# Patient Record
Sex: MALE | Race: AMERICAN INDIAN OR ALASKA NATIVE
[De-identification: names, ages, dates, MRNs, and addresses within clinical notes are randomized per-mention and may not be internally consistent; named-entity substitution may affect disease eponyms.]

---

## 2019-04-06 ENCOUNTER — HOSPITAL ENCOUNTER (INPATIENT)
Dept: HOSPITAL 31 - C.ER | Age: 34
LOS: 4 days | Discharge: HOME | DRG: 750 | End: 2019-04-10
Attending: PSYCHIATRY & NEUROLOGY | Admitting: PSYCHIATRY & NEUROLOGY
Payer: COMMERCIAL

## 2019-04-06 DIAGNOSIS — F41.9: ICD-10-CM

## 2019-04-06 DIAGNOSIS — F25.0: Primary | ICD-10-CM

## 2019-04-06 DIAGNOSIS — F12.259: ICD-10-CM

## 2019-04-06 LAB
ALBUMIN SERPL-MCNC: 5.2 G/DL (ref 3.5–5)
ALBUMIN/GLOB SERPL: 1.3 {RATIO} (ref 1–2.1)
ALT SERPL-CCNC: 42 U/L (ref 21–72)
ANISOCYTOSIS BLD QL SMEAR: SLIGHT
AST SERPL-CCNC: 76 U/L (ref 17–59)
BASOPHILS # BLD AUTO: 0 K/UL (ref 0–0.2)
BASOPHILS NFR BLD: 0.7 % (ref 0–2)
BILIRUB UR-MCNC: NEGATIVE MG/DL
BUN SERPL-MCNC: 16 MG/DL (ref 9–20)
CALCIUM SERPL-MCNC: 10.5 MG/DL (ref 8.6–10.4)
EOSINOPHIL # BLD AUTO: 0 K/UL (ref 0–0.7)
EOSINOPHIL NFR BLD: 0.2 % (ref 0–4)
ERYTHROCYTE [DISTWIDTH] IN BLOOD BY AUTOMATED COUNT: 13.7 % (ref 11.5–14.5)
GFR NON-AFRICAN AMERICAN: > 60
GIANT PLATELETS BLD QL SMEAR: PRESENT
GLUCOSE UR STRIP-MCNC: NORMAL MG/DL
HGB BLD-MCNC: 16.3 G/DL (ref 12–18)
LEUKOCYTE ESTERASE UR-ACNC: (no result) LEU/UL
LG PLATELETS BLD QL SMEAR: PRESENT
LYMPHOCYTE: 11 % (ref 20–40)
LYMPHOCYTES # BLD AUTO: 0.6 K/UL (ref 1–4.3)
LYMPHOCYTES NFR BLD AUTO: 9.5 % (ref 20–40)
MCH RBC QN AUTO: 29.7 PG (ref 27–31)
MCHC RBC AUTO-ENTMCNC: 33.7 G/DL (ref 33–37)
MCV RBC AUTO: 87.9 FL (ref 80–94)
MONOCYTE: 6 % (ref 0–10)
MONOCYTES # BLD: 0.3 K/UL (ref 0–0.8)
MONOCYTES NFR BLD: 4.2 % (ref 0–10)
NEUTROPHILS # BLD: 5.7 K/UL (ref 1.8–7)
NEUTROPHILS NFR BLD AUTO: 83 % (ref 50–75)
NEUTROPHILS NFR BLD AUTO: 85.4 % (ref 50–75)
NRBC BLD AUTO-RTO: 0.1 % (ref 0–2)
PH UR STRIP: 5 [PH] (ref 5–8)
PLATELET # BLD EST: NORMAL 10*3/UL
PLATELET # BLD: 268 K/UL (ref 130–400)
PMV BLD AUTO: 8.9 FL (ref 7.2–11.7)
PROT UR STRIP-MCNC: (no result) MG/DL
RBC # BLD AUTO: 5.49 MIL/UL (ref 4.4–5.9)
RBC # UR STRIP: NEGATIVE /UL
SP GR UR STRIP: 1.03 (ref 1–1.03)
SQUAMOUS EPITHIAL: 1 /HPF (ref 0–5)
TOTAL CELLS COUNTED BLD: 100
UROBILINOGEN UR-MCNC: NORMAL MG/DL (ref 0.2–1)
WBC # BLD AUTO: 6.6 K/UL (ref 4.8–10.8)

## 2019-04-06 NOTE — RAD
Date of service: 



04/06/2019



HISTORY:

 medical clearance 



COMPARISON:

No prior.



TECHNIQUE:

Chest PA and lateral views



FINDINGS:



LUNGS:

No active pulmonary disease.



PLEURA:

No significant pleural effusion identified. No pneumothorax apparent.



CARDIOVASCULAR:

No aortic atherosclerotic calcification present.



Normal cardiac size. No pulmonary vascular congestion. 



OSSEOUS STRUCTURES:

No significant abnormalities.



VISUALIZED UPPER ABDOMEN:

Normal.



OTHER FINDINGS:

None.



IMPRESSION:

No active disease.

## 2019-04-07 RX ADMIN — DIVALPROEX SODIUM SCH MG: 500 TABLET, DELAYED RELEASE ORAL at 17:41

## 2019-04-07 NOTE — PCM.PSYCH
Initial Psychiatric Evaluation





- Initial Psychiatric Evaluation


Type of Admission: Voluntary


Legal Status: Capacity


Chief Complaint (in patient's own words): 


I want to leave.


History of Present Illness and Precipitating Events: 





Patient is a 32 y/o -American male, who was escorted to the ED by his 

family because of the bizarre behavior. 





As per the mother, patient has been paranoid and bizarre since yesterday. 

Patient told family that it feels like the devil is out to get him.  As noted, 

patient had a similar episode 4 years ago, when he smoked laced marijuana.  He 

was admitted at the [facility] involuntarily.  However he did not follow-

up with any psychiatrist after discharge. As per the mother, he has been smoking

marijuana again.  They brought him to the ED as he was uncontrollable and 

violent towards the family.





Patient appeared very disorganized and internally preoccupied.  He was 

responding to internal stimuli.  As per the staff, he is becoming increasingly 

irritable and agitated and attacking the staff in the ED.  Patient was medicated

and put on four-point restraint multiple times due to aggressive and agitated 

behavior towards the staff.  He remained paranoid, delusional and bizarre.  He 

was put on one-to-one.  He appeared to have loose associations and he remained 

partially mute to most of the questions.





Past medical history


None reported


Current Medications: 





Active Medications











Generic Name Dose Route Start Last Admin





  Trade Name Freq  PRN Reason Stop Dose Admin


 


Benztropine Mesylate  2 mg  04/07/19 10:07  





  Cogentin  PO   





  Q6 PRN   





  Extra Pyramidal Symptoms   





     





     





     


 


Diphenhydramine HCl  50 mg  04/07/19 10:07  





  Benadryl  PO   





  Q6 PRN   





  Extra Pyramidal Symptoms   





     





     





     


 


Diphenhydramine HCl  50 mg  04/07/19 10:08  





  Benadryl  IM   





  Q8 PRN   





  Agitation   





     





     





     


 


Haloperidol  5 mg  04/07/19 10:07  





  Haldol  PO   





  Q8 PRN   





  Moderate Agitation   





     





     





     


 


Haloperidol Lactate  5 mg  04/07/19 10:07  04/07/19 10:31





  Haldol  IM   5 mg





  Q8 PRN   Administration





  Moderate Agitation    





     





     





     


 


Lorazepam  2 mg  04/07/19 10:07  





  Ativan  PO   





  Q8H PRN   





  Severe Agitation   





     





     





     


 


Lorazepam  1 mg  04/07/19 10:08  





  Ativan  IM   





  Q6H PRN   





  Anxiety   





     





     





     














Past Psychiatric History





- Past Psychiatric History


Previous Treatment History: Inpatient


Pertinent Medical Hx (Current Medical&Sleep Prob, Allergies): 





                                    Allergies











Allergy/AdvReac Type Severity Reaction Status Date / Time


 


No Known Allergies Allergy   Verified 04/06/19 15:12








                                        





No Known Home Med  04/06/19 











Review of Systems





- Review of Systems


All systems: reviewed and no additional remarkable complaints except





- Psychiatric


Psychiatric: Auditory Hallucinations, Behavioral Changes, Hallucinations, 

Irritability, Paranoia





Mental Status Examination





- Personal Presentation


Personal Presentation: Looks stated age





- Affect


Affect: Broad





- Motor Activity


Motor Activity: Psychomotor Agitation





- Reliability in Providing Information


Reliability in Providing Information: Poor, due to alteration in thoughts, Poor,

due to altered mood





- Speech


Speech: Disorganized





- Mood


Mood: Euphoric





- Formal Thought Process


Formal Thought Process: Hallucinations, Delusions, Paranoia, Loosening of 

associations, Flight of ideas





- Hallucinations/Delusions


Hallucinations: Visual, Auditory


Delusions: Persecution





- Obsessions/Compulsions


Obsessions: No


Compulsions: No





- Cognitive Functions


Orientation: Person, Place, Situation, Time


Sensorium: Alert


Attention/Concentration: Attentive


Abstract Thinking: Rufe


Estimate of Intelligence: Below average


Judgement: Imparied, as evidence by: Poor judgement, Imparied, as evidence by: 

Lack of insight into illness





- Risk


Risk: Homicidal, Elopement, Diminished functioning





- Strength & Assets Inventory


Strength & Assets Inventory: Family support





DSM 5 DX





- DSM 5


DSM 5 Diagnosis: 





Schizoaffective disorder bipolar type


Cannabis use disorder severe





- Recommended/Plan of Treatment


Treatment Recommendations and Plan of Treatment: 








Schizoaffective disorder bipolar type


Cannabis use disorder severe








Supportive therapy


Haldol for psychosis


Depakote for mood


Klonopin for anxiety





Patient committed involuntarily by [facility] [place], waiting for the placement

## 2019-04-08 VITALS — OXYGEN SATURATION: 97 %

## 2019-04-08 PROCEDURE — GZ3ZZZZ MEDICATION MANAGEMENT: ICD-10-PCS | Performed by: PSYCHIATRY & NEUROLOGY

## 2019-04-08 PROCEDURE — GZ56ZZZ INDIVIDUAL PSYCHOTHERAPY, SUPPORTIVE: ICD-10-PCS | Performed by: PSYCHIATRY & NEUROLOGY

## 2019-04-08 PROCEDURE — GZHZZZZ GROUP PSYCHOTHERAPY: ICD-10-PCS | Performed by: PSYCHIATRY & NEUROLOGY

## 2019-04-08 RX ADMIN — DIVALPROEX SODIUM SCH: 500 TABLET, DELAYED RELEASE ORAL at 19:24

## 2019-04-08 RX ADMIN — DIVALPROEX SODIUM SCH MG: 500 TABLET, DELAYED RELEASE ORAL at 18:33

## 2019-04-08 RX ADMIN — DIVALPROEX SODIUM SCH MG: 500 TABLET, DELAYED RELEASE ORAL at 09:57

## 2019-04-08 NOTE — PCM.BM
<Estiven Rowe - Last Filed: 04/08/19 17:15>





Treatment Plan Problems





- Problems identified on initial assessmt


  ** Delusions


Date Initiated: 04/08/19


Time Initiated: 17:16


Assessment reference: NA


Status: Active





  ** Thought process


Date Initiated: 04/08/19


Time Initiated: 17:16


Status: Active





Treatment assets and liabiliti


Patient Assests: motivated, self-reliant, ADL independent, physically healthy, 

negotiates basic needs, cognitively intact


Patient Liabilities: dietary restrictions (Pork), substance abuse (Cannabis), 

medical problems (hx of depression, temp psychosis), legal issue (hx of arrest)





- Milieu Protocol


Maintain good personal hygiene: daily Encourage regular showers, daily Remind 

patient to perform daily oral care, every shift Assist patient to perform ADL's


Conduct patient checks and document Observation sheet: Q15 minutes (For safety)


Maintain personal safety: every shift Educate patient to report safety concerns 

to staff, every shift Monitor environment for contraband/sharps


Medication safety: Monitor for expected outcome, potential side effects: every 

shift, Assess barriers to learning: every shift, Assess readiness for medication

education: every shift


Milieu Narrative: 








Schizoaffective disorder bipolar type


Cannabis use disorder severe








Supportive therapy


Haldol for psychosis


Depakote for mood


Klonopin for anxiety





Patient committed involuntarily by [facility] [place], waiting for the placement





Discharge/Continuing Care





- Treatment Team Participation


Patient/Family/SO Statement: 








Schizoaffective disorder bipolar type


Cannabis use disorder severe








Supportive therapy


Haldol for psychosis


Depakote for mood


Klonopin for anxiety





Patient committed involuntarily by [facility] [place], waiting for the placement





<Sultana Goss - Last Filed: 04/10/19 11:30>





- Diagnosis


(1) Schizophrenia


Status: Acute   


Interventions: 





04/10/19 11:30


* Assess/adjust medications daily and /or as needed


* See patient on an individual basis 7x/week to assess status of hallucinations


* Discuss risks, benefits, side effects and alternatives of medications


* 








<Shasha Heaton - Last Filed: 04/10/19 12:38>





Family Contact


Family involvement: Patient does not wish Family/SO involvement


Family contact: Patient declines to allow family contact at present





- Goals for Treatment


Patient goals for treatment: "I want to go to an outpatient program."





Discharge/Continuing Care





- Education Needs


Education Needs: Patient Medication, Patient Diagnosis/Disease Process, Patient 

Coping Skills, Patient Placement options, Patient Community resources





- Discharge


Discharge Criteria: Free of paranoid thoughts, Free of agitation, Normal sleep 

pattern, Ability to care for self, No longer exhibiting s/s of withdrawal, 

Reduction of target symptoms


Discharge to:: Home, With Family





- Treatment Team Participation


Discussed with Family/SO: No


Was Patient/Family/SO present at Treatment Team Meeting: Yes

## 2019-04-09 VITALS — RESPIRATION RATE: 18 BRPM

## 2019-04-09 RX ADMIN — DIVALPROEX SODIUM SCH MG: 500 TABLET, DELAYED RELEASE ORAL at 10:11

## 2019-04-09 RX ADMIN — DIVALPROEX SODIUM SCH MG: 500 TABLET, DELAYED RELEASE ORAL at 17:35

## 2019-04-09 NOTE — PCM.PYCHPN
Psychiatric Progress Note





- Psychiatric Progress Note


Patient seen today, length of contact: 15 min


Patient Chief Complaint: 





I am feeling much better.'


Problems Identified/Issues Discussed: 





Patient was seen and evaluated, chart reviewed this with the staff.


As per staff patient appears more organized than before and he remained calm, 

cooperative and redirectable. 


Patient reports improvement in the paranoia and voices.


He is taking medication but denies any side effects.


He needs to stay longer for stabilization of the symptoms.


Supportive therapy was given


Medication Change: Yes


Medical Record Reviewed: Yes





Mental Status Examination





- Cognitive Function


Orientation: Person, Place, Situation, Time


Memory: Intact


Attention: WNL


Concentration: WNL


Association: Loose


Fund of Knowledge: WNL





- Mood


Mood: Anxious





- Affect


Affect: Constricted





- Speech


Speech: Soft





- Formal Thought Process


Formal Thought Process: Loosening of associations





- Suicidal Ideation


Suicidal Ideation: No





- Homicidal Ideation


Homicidal Ideation: No





Goal/Treatment Plan





- Goal/Treatment Plan


Need for Continued Stay: Remain at risks for inpatient hospitalization


Progress Toward Problem(s) and Goals/Treatment Plan: 








Schizoaffective disorder bipolar type


Cannabis use disorder severe








Supportive therapy


Haldol for psychosis


Depakote for mood


Klonopin for anxiety

## 2019-04-09 NOTE — PCM.PYCHPN
Psychiatric Progress Note





- Psychiatric Progress Note


Patient seen today, length of contact: 15 min


Patient Chief Complaint: 





I am feeling little better.'


Problems Identified/Issues Discussed: 





Patient was seen and evaluated, chart reviewed this with the staff.


Patient reports some improvement in the paranoia.


As per staff patient appears more organized and less internally preoccupied than

before.


He started becoming calm, cooperative and redirectable. 


He reports that he was smoking marijuana with his friends, then he does not know

what happened.


He is able to sign voluntarily.


He is taking medication but denies any side effects.


He needs to stay longer for stabilization of the symptoms.


Medication Change: Yes


Medical Record Reviewed: Yes





Mental Status Examination





- Cognitive Function


Orientation: Person, Place, Situation, Time


Memory: Intact


Attention: WNL


Concentration: WNL


Association: Loose


Fund of Knowledge: Poor





- Mood


Mood: Anxious





- Affect


Affect: Constricted





- Speech


Speech: Soft





- Formal Thought Process


Formal Thought Process: Paranoia, Loosening of associations





- Suicidal Ideation


Suicidal Ideation: No





- Homicidal Ideation


Homicidal Ideation: No





Goal/Treatment Plan





- Goal/Treatment Plan


Need for Continued Stay: Remain at risks for inpatient hospitalization


Progress Toward Problem(s) and Goals/Treatment Plan: 








Schizoaffective disorder bipolar type


Cannabis use disorder severe








Supportive therapy


Haldol for psychosis


Depakote for mood


Klonopin for anxiety











- Smoking Cessation


Smoking Cessation Initiated: No

## 2019-04-10 VITALS — SYSTOLIC BLOOD PRESSURE: 125 MMHG | HEART RATE: 93 BPM | DIASTOLIC BLOOD PRESSURE: 79 MMHG | TEMPERATURE: 98.4 F

## 2019-04-10 RX ADMIN — DIVALPROEX SODIUM SCH: 500 TABLET, DELAYED RELEASE ORAL at 09:47

## 2019-04-10 NOTE — PCM.PYCHDC
Mental Status Examination





- Mental Status Examination


Orientation: Person, Place, Situation, Time


Memory: Intact


Affect: Constricted


Speech: Soft


Attention: WNL


Concentration: WNL


Association: WNL


Fund of Knowledge: WNL


Formal Thought Process: No Impairment


Description of patient's judgement and insight: 





good, fair


Psychotic Thoughts and Behaviors: 





denies any AVH


Suicidal Ideation: No


Current Homicidal Ideation?: No





Discharge Summary





- Discharge Note


Reason for Hospitalization: 








Patient is a 32 y/o -American male, who was escorted to the ED by his 

family because of the bizarre behavior. 





As per the mother, patient has been paranoid and bizarre since yesterday. 

Patient told family that it feels like the devil is out to get him.  As noted, 

patient had a similar episode 4 years ago, when he smoked laced marijuana.  He 

was admitted at the [facility] involuntarily.  However he did not follow-

up with any psychiatrist after discharge. As per the mother, he has been smoking

marijuana again.  They brought him to the ED as he was uncontrollable and 

violent towards the family.





Patient appeared very disorganized and internally preoccupied.  He was 

responding to internal stimuli.  As per the staff, he is becoming increasingly 

irritable and agitated and attacking the staff in the ED.  Patient was medicated

and put on four-point restraint multiple times due to aggressive and agitated 

behavior towards the staff.  He remained paranoid, delusional and bizarre.  He 

was put on one-to-one.  He appeared to have loose associations and he remained 

partially mute to most of the questions.





Consultations:: List each consultation separately and include:  1. Reason for 

request.  2. Findings.  3. Follow-up


Summary of Hospital Course include:: 1. Description of specific treatment plan 

utilized for patients during their course of treatmen.  2. Summarize the time-

course for resolution of acute symptoms and/or regressed behaviors.  3. Describe

issues identified and worked on during hospitalization.  4. Describe medication 

utilized.  5. Describe medical problems identified and treated.  6. Reassessment

of suicide risk


Summary of Hospital Course: 





Patient is a 32 y/o -American male, who was escorted to the ED by his 

family because of the bizarre behavior. 





As per the mother, patient has been paranoid and bizarre since yesterday. 

Patient told family that it feels like the devil is out to get him.  As noted, 

patient had a similar episode 4 years ago, when he smoked laced marijuana.  He 

was admitted at the [facility] involuntarily.  However he did not follow-

up with any psychiatrist after discharge. As per the mother, he has been smoking

marijuana again.  They brought him to the ED as he was uncontrollable and 

violent towards the family.





Patient appeared very disorganized and internally preoccupied.  He was 

responding to internal stimuli.  As per the staff, he is becoming increasingly 

irritable and agitated and attacking the staff in the ED.  Patient was medicated

and put on four-point restraint multiple times due to aggressive and agitated 

behavior towards the staff.  He remained paranoid, delusional and bizarre.  He 

was put on one-to-one.  He appeared to have loose associations and he remained 

partially mute to most of the questions.





Past medical history


None reported





- Final Diagnosis (DSM 5)


Condition upon Discharge: STABLE


Disposition: HOME/ ROUTINE


Follow-up Treatment Plan: 








Schizoaffective disorder bipolar type


Cannabis use disorder severe








Supportive therapy


Haldol for psychosis


Depakote for mood


Klonopin for anxiety








Prescriptions/Medication Reconciliation: 


Benztropine [Cogentin] 1 mg PO BID #60 tab


Divalproex [Depakote DR] 500 mg PO BID #60 tcp


Haloperidol [Haldol] 5 mg PO BID #60 tab